# Patient Record
Sex: FEMALE
[De-identification: names, ages, dates, MRNs, and addresses within clinical notes are randomized per-mention and may not be internally consistent; named-entity substitution may affect disease eponyms.]

---

## 2018-04-04 ENCOUNTER — HOSPITAL ENCOUNTER (OUTPATIENT)
Dept: HOSPITAL 88 - RAD | Age: 38
End: 2018-04-04
Attending: INTERNAL MEDICINE
Payer: COMMERCIAL

## 2018-04-04 DIAGNOSIS — M54.5: Primary | ICD-10-CM

## 2018-04-04 PROCEDURE — 72110 X-RAY EXAM L-2 SPINE 4/>VWS: CPT

## 2018-04-04 NOTE — DIAGNOSTIC IMAGING REPORT
PROCEDURE:L-SPINE COMPLETE

 

COMPARISON:Patients [facility], DX, LUMBAR SPINE, COMPLETE, 

2/29/2012, 9:40.

 

INDICATIONS:LOWER BACK PAIN

 

FINDINGS:

There are 5 lumbar-type vertebral bodies. 

Normal mineralization.

There are no acute, displaced fractures, lytic or blastic lesions. 

Stable grade 2 anterolisthesis of L5 on S1 secondary to bilateral pars 

interarticularis defects.

Mild intervertebral disc space narrowing L5-S1.

Other disc-space heights are well-maintained.

Vertebral body heights are maintained.

Sacroiliac joints are unremarkable.

Soft tissues are unremarkable.

 

CONCLUSION:

No acute abnormalities.

Stable grade 2 anterolisthesis of L5 on S1 secondary to bilateral pars 

interarticularis defects. 

 

Simon Lambert M.D.  

Dictated by:  Simon Lambert M.D. on 4/04/2018 at 18:40     

Electronically approved by:  Simon Lambert M.D. on 4/04/2018 at 

18:40